# Patient Record
Sex: FEMALE | Race: WHITE
[De-identification: names, ages, dates, MRNs, and addresses within clinical notes are randomized per-mention and may not be internally consistent; named-entity substitution may affect disease eponyms.]

---

## 2017-03-13 ENCOUNTER — HOSPITAL ENCOUNTER (EMERGENCY)
Dept: HOSPITAL 45 - C.EDB | Age: 82
Discharge: HOME | End: 2017-03-13
Payer: COMMERCIAL

## 2017-03-13 VITALS
BODY MASS INDEX: 24.83 KG/M2 | HEIGHT: 67.99 IN | WEIGHT: 163.8 LBS | WEIGHT: 163.8 LBS | HEIGHT: 67.99 IN | BODY MASS INDEX: 24.83 KG/M2

## 2017-03-13 VITALS
DIASTOLIC BLOOD PRESSURE: 99 MMHG | TEMPERATURE: 97.88 F | OXYGEN SATURATION: 98 % | SYSTOLIC BLOOD PRESSURE: 222 MMHG | HEART RATE: 64 BPM

## 2017-03-13 DIAGNOSIS — E03.9: ICD-10-CM

## 2017-03-13 DIAGNOSIS — K21.9: ICD-10-CM

## 2017-03-13 DIAGNOSIS — Z85.3: ICD-10-CM

## 2017-03-13 DIAGNOSIS — Z90.10: ICD-10-CM

## 2017-03-13 DIAGNOSIS — E11.9: ICD-10-CM

## 2017-03-13 DIAGNOSIS — Z79.899: ICD-10-CM

## 2017-03-13 DIAGNOSIS — I10: Primary | ICD-10-CM

## 2017-03-13 LAB
ALP SERPL-CCNC: 72 U/L (ref 45–117)
ALT SERPL-CCNC: 18 U/L (ref 12–78)
ANION GAP SERPL CALC-SCNC: 10 MMOL/L (ref 3–11)
APPEARANCE UR: CLEAR
AST SERPL-CCNC: 16 U/L (ref 15–37)
BASOPHILS # BLD: 0.06 K/UL (ref 0–0.2)
BASOPHILS NFR BLD: 0.9 %
BILIRUB UR-MCNC: (no result) MG/DL
BUN SERPL-MCNC: 31 MG/DL (ref 7–18)
BUN/CREAT SERPL: 23.5 (ref 10–20)
CALCIUM SERPL-MCNC: 9.5 MG/DL (ref 8.5–10.1)
CHLORIDE SERPL-SCNC: 106 MMOL/L (ref 98–107)
CO2 SERPL-SCNC: 25 MMOL/L (ref 21–32)
COLOR UR: YELLOW
COMPLETE: YES
CREAT CL PREDICTED SERPL C-G-VRATE: 35.4 ML/MIN
CREAT SERPL-MCNC: 1.3 MG/DL (ref 0.6–1.2)
EOSINOPHIL NFR BLD AUTO: 256 K/UL (ref 130–400)
GLUCOSE SERPL-MCNC: 110 MG/DL (ref 70–99)
HCT VFR BLD CALC: 38.9 % (ref 37–47)
IG%: 0.2 %
IMM GRANULOCYTES NFR BLD AUTO: 28.4 %
INR PPP: 1.1 (ref 0.9–1.1)
LYMPHOCYTES # BLD: 1.84 K/UL (ref 1.2–3.4)
MANUAL MICROSCOPIC REQUIRED?: NO
MCH RBC QN AUTO: 30.8 PG (ref 25–34)
MCHC RBC AUTO-ENTMCNC: 34.7 G/DL (ref 32–36)
MCV RBC AUTO: 88.6 FL (ref 80–100)
MONOCYTES NFR BLD: 12.7 %
NEUTROPHILS # BLD AUTO: 2.5 %
NEUTROPHILS NFR BLD AUTO: 55.3 %
NITRITE UR QL STRIP: (no result)
PARTIAL THROMBOPLASTIN RATIO: 1
PH UR STRIP: 5.5 [PH] (ref 4.5–7.5)
PMV BLD AUTO: 9.4 FL (ref 7.4–10.4)
POTASSIUM SERPL-SCNC: 3.6 MMOL/L (ref 3.5–5.1)
PROTHROMBIN TIME: 11.6 SECONDS (ref 9–12)
RBC # BLD AUTO: 4.39 M/UL (ref 4.2–5.4)
REVIEW REQ?: NO
SODIUM SERPL-SCNC: 141 MMOL/L (ref 136–145)
SP GR UR STRIP: 1.02 (ref 1–1.03)
TSH SERPL-ACNC: 1.93 UIU/ML (ref 0.3–4.5)
URINE BILL WITH OR WITHOUT MIC: (no result)
URINE EPITHELIAL CELL AUTO: (no result) /LPF (ref 0–5)
UROBILINOGEN UR-MCNC: (no result) MG/DL
WBC # BLD AUTO: 6.47 K/UL (ref 4.8–10.8)

## 2017-03-13 NOTE — EMERGENCY ROOM VISIT NOTE
History


Report prepared by Wendy:  Katie Estrada


Under the Supervision of:  Dr. Benitez Ryan M.D.


First contact with patient:  13:24


Chief Complaint:  HYPERTENSION


Stated Complaint:  HIGH BLOOD PRESSURE





History of Present Illness


The patient is a 81 year old female who presents to the Emergency Room with 

complaints of persistent hypertension throughout the day today. The patient 

states that she woke up this morning around 4AM feeling "miserable" and with 

ear ringing. She took her blood pressure at that time and it was elevated. She 

has taken it a few times since then and continued to have high blood pressure, 

so she decided to come to the ER. She reports that she had a stressor at baby 

shower this past weekend and she is under the impression that this could have 

contributed to her blood pressure. She has a history of hypertension and has 

been on medications in the past including Maxzide, but was taken off of the 

medications by her PCP, although she cannot remember when. Pt denies LOC, 

headache, fevers, chills, diaphoresis, visual changes, neck pain, chest pain, 

breathing difficulties, nausea, vomiting, abdominal pain, back pain, melena, 

hematochezia, urinary symptoms, numbness, weakness, lymphadenopathy, rash, or 

other complaints.





   Source of History:  patient


   Onset:  today


   Position:  other (global)


   Quality:  other (hypertension)


   Timing:  other (persistent)





Review of Systems


See HPI for pertinent positives and negatives.  A total of ten systems were 

reviewed and were otherwise negative.





Past Medical & Surgical


Medical Problems:


(1) Breast cancer


(2) Diet-controlled type 2 diabetes mellitus


(3) GERD (gastroesophageal reflux disease)


(4) HTN (hypertension)


(5) Hypothyroidism


Surgical Problems:


(1) H/O mastectomy








Family History


No pertinent family history stated.





Social History


Smoking Status:  Never Smoker


Marital Status:  


Housing Status:  lives with significant other





Current/Historical Medications


Scheduled


Amlodipine Besylate (Norvasc), 1 TAB PO DAILY


Cholecalciferol (Vitamin D3), 2,000 INTER.UNIT PO DAILY


Escitalopram Oxalate (Lexapro), 10 MG PO DAILY


Levothyroxine Sodium (Synthroid), 75 MCG PO DAILY


Ranitidine Hcl (Zantac), 150 MG PO DAILY





Allergies


Coded Allergies:  


     Sulfa Drugs (Verified  Allergy, Unknown, TAKES DYAZIDE AT HOME W/O RXN, 3/

13/17)





Physical Exam


Vital Signs











  Date Time  Temp Pulse Resp B/P Pulse Ox O2 Delivery O2 Flow Rate FiO2


 


3/13/17 18:08 36.6 64 13 222/99 98   


 


3/13/17 18:01    222/99    


 


3/13/17 18:00  64 13  98   


 


3/13/17 17:46    208/107    


 


3/13/17 17:45  62 14  95   


 


3/13/17 17:31    204/108    


 


3/13/17 17:30  62 15  98   


 


3/13/17 17:16    196/99    


 


3/13/17 17:15  63 18  100   


 


3/13/17 17:01    217/103    


 


3/13/17 17:00  68 21  100   


 


3/13/17 16:57    208/102    


 


3/13/17 16:55  69 18 208/102 97 Room Air  


 


3/13/17 16:54  67   87   


 


3/13/17 16:39  60   97   


 


3/13/17 16:24  60   99   


 


3/13/17 16:09  61   93   


 


3/13/17 15:54  62 19  97   


 


3/13/17 15:39  61 16  97   


 


3/13/17 15:24  60 14  98   


 


3/13/17 15:09  62 16  100   


 


3/13/17 14:54  61 13  97   


 


3/13/17 14:39  62 20  100   


 


3/13/17 14:38  62      


 


3/13/17 14:18  63 14 175/87 98 Room Air  


 


3/13/17 14:17    175/87    


 


3/13/17 14:00      Room Air  


 


3/13/17 12:52 36.6 68 16 106/66 99 Room Air  











Physical Exam


GENERAL: Awake, alert, well appearing, no distress


HENT: Normocephalic, atraumatic.  TM's normal.  Oropharynx unremarkable.


EYES: PERRL. EOMI.  Normal conjunctiva. Sclera non-icteric.


NECK: Supple. No nuchal rigidity.  FROM.  No JVD or bruit.


RESPIRATORY:  CTA


CARDIAC: RRR.  No murmur. 


ABDOMEN: Soft, non distended.  No tenderness to palpation.  No rebound or 

guarding.  No masses.


RECTAL: Deferred.


MUSCULOSKELETAL:  Unremarkable.  No edema.  No discoloration.  Gross motor 

strength symmetric.  


NEURO: Cranial nerves 2-12 grossly intact.   Normal sensorium.  No sensory or 

motor deficits noted.  Speech normal.  No pronator drift.   


SKIN: No rash or jaundice noted.


LYMPH: No adenopathy.





Medical Decision & Procedures


ER Provider


Diagnostic Interpretation:


Radiology results as stated below per my review and radiologist interpretation: 








SINGLE VIEW CHEST





CLINICAL HISTORY:  Hypertension.





FINDINGS: An AP, portable, upright chest radiograph is compared to study dated


11/29/2014. The examination is degraded by portable technique and patient


rotation.  The cardiomediastinal silhouette is unremarkable. There is


atherosclerotic calcification of the thoracic aorta. Chronic interstitial


thickening is noted. The lungs and pleural spaces are clear. No pneumothorax is


seen. The skeletal structures are osteopenic. Advanced arthritic change is seen


in the shoulders, right greater than left.





IMPRESSION: No acute cardiopulmonary abnormality.











Electronically signed by:  Skip Wong M.D.


3/13/2017 2:10 PM





Dictated Date/Time:  3/13/2017 2:09 PM





Laboratory Results


3/13/17 14:05








Red Blood Count 4.39, Mean Corpuscular Volume 88.6, Mean Corpuscular Hemoglobin 

30.8, Mean Corpuscular Hemoglobin Concent 34.7, Mean Platelet Volume 9.4, 

Neutrophils (%) (Auto) 55.3, Lymphocytes (%) (Auto) 28.4, Monocytes (%) (Auto) 

12.7, Eosinophils (%) (Auto) 2.5, Basophils (%) (Auto) 0.9, Neutrophils # (Auto

) 3.58, Lymphocytes # (Auto) 1.84, Monocytes # (Auto) 0.82, Eosinophils # (Auto

) 0.16, Basophils # (Auto) 0.06





3/13/17 14:05

















Test


  3/13/17


14:05 3/13/17


17:00


 


White Blood Count


  6.47 K/uL


(4.8-10.8) 


 


 


Red Blood Count


  4.39 M/uL


(4.2-5.4) 


 


 


Hemoglobin


  13.5 g/dL


(12.0-16.0) 


 


 


Hematocrit 38.9 % (37-47)  


 


Mean Corpuscular Volume


  88.6 fL


() 


 


 


Mean Corpuscular Hemoglobin


  30.8 pg


(25-34) 


 


 


Mean Corpuscular Hemoglobin


Concent 34.7 g/dl


(32-36) 


 


 


Platelet Count


  256 K/uL


(130-400) 


 


 


Mean Platelet Volume


  9.4 fL


(7.4-10.4) 


 


 


Neutrophils (%) (Auto) 55.3 %  


 


Lymphocytes (%) (Auto) 28.4 %  


 


Monocytes (%) (Auto) 12.7 %  


 


Eosinophils (%) (Auto) 2.5 %  


 


Basophils (%) (Auto) 0.9 %  


 


Neutrophils # (Auto)


  3.58 K/uL


(1.4-6.5) 


 


 


Lymphocytes # (Auto)


  1.84 K/uL


(1.2-3.4) 


 


 


Monocytes # (Auto)


  0.82 K/uL


(0.11-0.59) 


 


 


Eosinophils # (Auto)


  0.16 K/uL


(0-0.5) 


 


 


Basophils # (Auto)


  0.06 K/uL


(0-0.2) 


 


 


RDW Standard Deviation


  42.8 fL


(36.4-46.3) 


 


 


RDW Coefficient of Variation


  13.2 %


(11.5-14.5) 


 


 


Immature Granulocyte % (Auto) 0.2 %  


 


Immature Granulocyte # (Auto)


  0.01 K/uL


(0.00-0.02) 


 


 


Prothrombin Time


  11.6 SECONDS


(9.0-12.0) 


 


 


Prothromb Time International


Ratio 1.1 (0.9-1.1) 


  


 


 


Activated Partial


Thromboplast Time 25.7 SECONDS


(21.0-31.0) 


 


 


Partial Thromboplastin Ratio 1.0  


 


Anion Gap


  10.0 mmol/L


(3-11) 


 


 


Est Creatinine Clear Calc


Drug Dose 35.4 ml/min 


  


 


 


Estimated GFR (


American) 45.2 


  


 


 


Estimated GFR (Non-


American 39.0 


  


 


 


BUN/Creatinine Ratio 23.5 (10-20)  


 


Calcium Level


  9.5 mg/dl


(8.5-10.1) 


 


 


Total Bilirubin


  0.5 mg/dl


(0.2-1) 


 


 


Direct Bilirubin


  < 0.1 mg/dl


(0-0.2) 


 


 


Aspartate Amino Transf


(AST/SGOT) 16 U/L (15-37) 


  


 


 


Alanine Aminotransferase


(ALT/SGPT) 18 U/L (12-78) 


  


 


 


Alkaline Phosphatase


  72 U/L


() 


 


 


Total Protein


  7.0 gm/dl


(6.4-8.2) 


 


 


Albumin


  3.8 gm/dl


(3.4-5.0) 


 


 


Lipase


  164 U/L


() 


 


 


Thyroid Stimulating Hormone


(TSH) 1.930 uIu/ml


(0.300-4.500) 


 


 


Urine Color  YELLOW 


 


Urine Appearance  CLEAR (CLEAR) 


 


Urine pH  5.5 (4.5-7.5) 


 


Urine Specific Gravity


  


  1.017


(1.000-1.030)


 


Urine Protein  NEG (NEG) 


 


Urine Glucose (UA)  NEG (NEG) 


 


Urine Ketones  TRACE (NEG) 


 


Urine Occult Blood  NEG (NEG) 


 


Urine Nitrite  NEG (NEG) 


 


Urine Bilirubin  NEG (NEG) 


 


Urine Urobilinogen  NEG (NEG) 


 


Urine Leukocyte Esterase  TRACE (NEG) 


 


Urine WBC (Auto)  1-5 /hpf (0-5) 


 


Urine RBC (Auto)  0-4 /hpf (0-4) 


 


Urine Hyaline Casts (Auto)  1-5 /lpf (0-5) 


 


Urine Epithelial Cells (Auto)


  


  20-30 /lpf


(0-5)


 


Urine Bacteria (Auto)  NEG (NEG) 





Laboratory results reviewed by me





Medications Administered











 Medications


  (Trade)  Dose


 Ordered  Sig/Jimmy


 Route  Start Time


 Stop Time Status Last Admin


Dose Admin


 


 Amlodipine


 Besylate


  (Norvasc Tab)  5 mg  NOW  ONCE


 PO  3/13/17 17:15


 3/13/17 17:16 DC 3/13/17 18:10


5 MG











ECG


Indication:  other (hypertension)


Rate (beats per minute):  67


Rhythm:  normal sinus


Findings:  LAFB, Q waves (Septal), no ectopy, other (LVH)





ED Course


1330: The patient was evaluated in room C2. A complete history and physical 

exam was performed.





1614: I reassessed the patient and updated her on results so far. 





1634: I discussed the case with Dr. Browning - [facility]. She 

recommended 5 of Norvasc. She will see the patient in the office. 





1715: Ordered Norvasc 5 mg PO. 





1717: I reevaluated the patient. She was resting comfortably. Discussed results 

and discharge instructions: She verbalized understanding and agreement. The 

patient is ready for discharge.





Medical Decision


Prior records/ancillary studies reviewed regarding the history above.





Triage Nursing notes reviewed and agree them.





Additional history obtained from the family.





The patient's history was concerning for hypertension.





Differential diagnosis:


Etiologies such as benign hypertension, hypertensive emergency, cardiovascular 

pathology, pheochromocytoma, electrolyte abnormality, renal disease, endorgan 

damage, as well as others were entertained. 





Physical examination:


As above.clinically the patient was doing very well.  Asymptomatic 


 


ER treatment provided:


Oral Norvasc


On reassessment the patient felt better.





Diagnostic interpretation by me:


The electrocardiogram was negative for pathologic change. 





The labs revealed an unremarkable CT scan, chemistry panel, urinalysis, LFTs 

and TSH.





Imaging studies:


Chest x-ray as above.





Consultation:


A consultation was placed with the patient's primary physician. The case was 

discussed and diagnostics were reviewed.  Norvasc 5 mg was recommended with 

close follow-up in the office for evaluation and continued management





The patient has significant hypertension but is asymptomatic.


By the evaluation outlined above emergent etiologies such as hypertensive 

emergency, pheochromocytoma, endorgan damage,  cardiac ischemia, aortic 

dissection, pulmonary embolism, pneumonia, pneumothorax, infections, 

gastrointestinal, as well as others were deemed relatively unlikely.  





The patient and  were informed about the findings as listed above.  All 

questions were answered and they were pleased with the treatment.  Return 

instructions were outlined and the patient was discharged in stable condition.  





Outpatient prescription management:


Norvasc 





Referral:


The patient was referred back to her primary care physician for follow-up in 2 

to 3 days for a recheck of the current condition.











The chart was completed utilizing Dragon Speech voice recognition software.   

Grammatical errors, random word insertions, pronoun errors, and incomplete 

sentences are an occasional consequence of this system due to software 

limitations, ambient noise, and hardware issues.  Any formal questions or 

concerns about the content, text, or information contained within the body of 

this dictation should be directly addressed to the physician for clarification.





Consults


Time Called:  1630


Consulting Physician:  Dr. Browning - [facility]


Returned Call:  1634


I discussed the case with her. She recommended 5 of Norvasc. She will see the 

patient in the office.





Impression





 Primary Impression:  


 HTN (hypertension)





Scribe Attestation


The scribe's documentation has been prepared under my direction and personally 

reviewed by me in its entirety. I confirm that the note above accurately 

reflects all work, treatment, procedures, and medical decision making performed 

by me.





Departure Information


Dispostion


Home / Self-Care





Prescriptions





Amlodipine Besylate (NORVASC) 5 Mg Tab


1 TAB PO DAILY for 30 Days, #30 TAB


   Prov: Benitez Ryan MD         3/13/17





Referrals


Yash Browning M.D. (PCP)





Patient Instructions


Hypertension [place], [facility]





Additional Instructions





Monitor blood pressure twice daily and record this for your family doctor.





Rest and drink plenty of fluids as tolerated.





Continue current medications.





Avoid strenuous activities and anything that worsens your pain.  Resume normal 

activities once your symptoms resolve.    





Return to the ER immediately for passing out, severe headache, chest pain, 

abdominal pain, vomiting, fevers, chest pains, difficulty breathing, worsening 

of your condition, or as needed.





Follow up with your primary physician tomorrow to schedule an appointment for a 

recheck of your current condition.

## 2017-03-13 NOTE — DIAGNOSTIC IMAGING REPORT
SINGLE VIEW CHEST



CLINICAL HISTORY:  Hypertension.



FINDINGS: An AP, portable, upright chest radiograph is compared to study dated

11/29/2014. The examination is degraded by portable technique and patient

rotation.  The cardiomediastinal silhouette is unremarkable. There is

atherosclerotic calcification of the thoracic aorta. Chronic interstitial

thickening is noted. The lungs and pleural spaces are clear. No pneumothorax is

seen. The skeletal structures are osteopenic. Advanced arthritic change is seen

in the shoulders, right greater than left.



IMPRESSION: No acute cardiopulmonary abnormality.







Electronically signed by:  Skip Wong M.D.

3/13/2017 2:10 PM



Dictated Date/Time:  3/13/2017 2:09 PM

## 2018-04-26 ENCOUNTER — HOSPITAL ENCOUNTER (EMERGENCY)
Dept: HOSPITAL 45 - C.EDA | Age: 83
Discharge: HOME | End: 2018-04-26
Payer: COMMERCIAL

## 2018-04-26 VITALS — DIASTOLIC BLOOD PRESSURE: 76 MMHG | HEART RATE: 65 BPM | SYSTOLIC BLOOD PRESSURE: 180 MMHG

## 2018-04-26 VITALS — TEMPERATURE: 97.7 F

## 2018-04-26 VITALS
WEIGHT: 152.12 LBS | BODY MASS INDEX: 23.05 KG/M2 | HEIGHT: 67.99 IN | BODY MASS INDEX: 23.05 KG/M2 | HEIGHT: 67.99 IN | WEIGHT: 152.12 LBS

## 2018-04-26 VITALS — OXYGEN SATURATION: 98 %

## 2018-04-26 DIAGNOSIS — Z23: ICD-10-CM

## 2018-04-26 DIAGNOSIS — Z90.10: ICD-10-CM
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
## 2018-04-26 DIAGNOSIS — Z88.2: ICD-10-CM

## 2018-04-26 DIAGNOSIS — Z85.3: ICD-10-CM

## 2018-04-26 DIAGNOSIS — E03.9: ICD-10-CM

## 2018-04-26 DIAGNOSIS — S09.90XA: Primary | ICD-10-CM

## 2018-04-26 DIAGNOSIS — I10: ICD-10-CM
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
## 2018-04-26 DIAGNOSIS — K21.9: ICD-10-CM
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
## 2018-04-26 DIAGNOSIS — E11.9: ICD-10-CM

## 2018-04-26 DIAGNOSIS — S01.81XA: ICD-10-CM

## 2018-04-26 DIAGNOSIS — Z79.899: ICD-10-CM
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
## 2018-04-26 NOTE — DIAGNOSTIC IMAGING REPORT
CT OF THE CERVICAL SPINE WITHOUT CONTRAST



CLINICAL HISTORY: Fall.    



COMPARISON STUDY:  No previous studies for comparison. 



TECHNIQUE:  Helical axial images of the cervical spine were obtained without IV

contrast.  Sagittal and coronal reconstructions were viewed.  A dose lowering

technique was utilized adhering to the principles of ALARA.





FINDINGS: Alignment of the cervical spine is anatomic. No fracture is present.

Craniocervical junction is intact. There is moderate multilevel degenerative

disc disease and facet arthrosis. There is no prevertebral edema.





IMPRESSION: No acute cervical spine fracture or subluxation.







Electronically signed by:  Lamberto Nunez M.D.

4/26/2018 3:47 PM



Dictated Date/Time:  4/26/2018 3:33 PM

## 2018-04-26 NOTE — DIAGNOSTIC IMAGING REPORT
CHEST ONE VIEW PORTABLE



CLINICAL HISTORY: trauma    



COMPARISON STUDY:  Chest radiograph March 13, 2017.



FINDINGS: There are right axillary surgical clips. No pneumothorax or pleural

effusion is noted. Patient is mildly rotated. No airspace opacities are present.

Cardiomediastinal silhouette is unremarkable. 



IMPRESSION:  No acute cardiopulmonary findings. 









Electronically signed by:  Lamberto Nunez M.D.

4/26/2018 3:49 PM



Dictated Date/Time:  4/26/2018 3:48 PM

## 2018-04-26 NOTE — EMERGENCY ROOM VISIT NOTE
History


Report prepared by Wendy:  Clark Weston


Under the Supervision of:  Dr. Eleonora Rios D.O.


First contact with patient:  14:50


Chief Complaint:  FALL


Stated Complaint:  FALL, LAC ABOVE LEFT EYE





History of Present Illness


The patient is an 82 year old female who presents to the Emergency Room via EMS 

with complaints of a sudden mechanical fall that occurred earlier this 

afternoon. She states that she is coming from home. The patient notes that she 

was walking out of her garage, and was walking down 2 concrete steps when she 

lost her balance and stumbled forward. She says that she tried to get her hands 

out to break the fall, and grazed her head on the surrounding ground, but did 

not hit her head on the concrete. The patient says that she was not dizzy or 

lightheaded before or after the fall. She notes that she has a resulting 

laceration around her left eye, and a laceration on her left hand. Per EMS, the 

patient was a bit hypertensive after the fall, but has a history of 

hypertension and is on Amlodipine for that. The patient adds that she is a bit 

nauseous, but that is from being nervous. She says that she feels fine otherwise

, and denies any loss of consciousness. The patient states that she remembers 

everything from the fall. She denies any chest pain, shortness of breath, 

abdominal pain, back pain, or vomiting. The patient adds that she is not on any 

blood thinners. She notes that she takes Tylenol every evening. She says that 

she has a history of losing her balance. The patient does not recall when her 

last tetanus shot was.





   Source of History:  patient, EMS


   Onset:  [place] this afternoon


   Position:  other (global)


   Symptom Intensity:  remembers whole event


   Quality:  other (fall - mechanical)


   Timing:  other (sudden)


   Associated Symptoms:  + nausea, No LOC, No chest pain, No SOB, No vomiting, 

No abdominal pain, No back pain


Note:


Associated symptoms: Laceration left eye, and left hand. Denies dizziness, 

lightheadedness.





Review of Systems


See HPI for pertinent positives & negatives. A total of 10 systems reviewed and 

were otherwise negative.





Past Medical & Surgical


Medical Problems:


(1) Breast cancer


(2) Diet-controlled type 2 diabetes mellitus


(3) GERD (gastroesophageal reflux disease)


(4) HTN (hypertension)


(5) Hypothyroidism


Surgical Problems:


(1) H/O mastectomy








Family History


Family history omitted secondary to patient's advanced age.





Social History


Smoking Status:  Never Smoker


Marital Status:  


Housing Status:  lives with significant other





Current/Historical Medications


Scheduled


Acetaminophen (Tylenol), 500 MG PO QPM


Amlodipine (Norvasc), 5 MG PO QPM


Cholecalciferol (Vitamin D3), 2,000 INTER.UNIT PO QPM


Escitalopram Oxalate (Lexapro), 10 MG PO QPM


Levothyroxine Sodium (Levothyroxine Sodium), 125 MCG PO QPM


Ranitidine Hcl (Zantac), 150 MG PO QPM


Solifenacin Succinate (Vesicare), 5 MG PO QPM





Allergies


Coded Allergies:  


     Sulfa Drugs (Verified  Allergy, Unknown, TAKES DYAZIDE AT HOME W/O RXN, 4/ 26/18)





Physical Exam


Vital Signs











  Date Time  Temp Pulse Resp B/P (MAP) Pulse Ox O2 Delivery O2 Flow Rate FiO2


 


4/26/18 18:25  65 20 180/76    


 


4/26/18 17:29  78 20 216/102    


 


4/26/18 15:58  82 20 204/106 98 Room Air  


 


4/26/18 15:08  76 20 193/94 98 Room Air  


 


4/26/18 14:59 36.5 77 16 202/121 98 Room Air  











Physical Exam


GENERAL: alert, well appearing, well nourished, no distress, non-toxic 


EYE EXAM: normal conjunctiva, PERRL and EOM's grossly intact


HEAD: Superficial abrasion at the left lateral supraorbital ridge. No other 

facial swelling or bony tenderness. Head is otherwise normocephalic atraumatic.


OROPHARYNX: no exudate, no erythema, lips, buccal mucosa, and tongue normal and 

mucous membranes are moist


NECK: supple, no nuchal rigidity, no adenopathy, non-tender


LUNGS: Clear to auscultation. Normal chest wall mechanics


HEART: no murmurs, S1 normal and S2 normal 


ABDOMEN: abdomen soft, non-tender, normo-active bowel sounds, no masses, no 

rebound or guarding. 


BACK: Back is symmetrical on inspection and there is no deformity, no midline 

tenderness, no CVA tenderness. 


SKIN: no rashes and no bruising 


UPPER EXTREMITIES: Superficial abrasion to the proximal palmar aspect of the 

left hand.


LOWER EXTREMITIES: No pitting edema.


NEURO EXAM: Normal sensorium, cranial nerves II-XII grossly intact, normal 

speech,  no gross weakness of arms, no gross weakness of legs.





Medical Decision & Procedures


ER Provider


Diagnostic Interpretation:


Radiology results have been interpreted by the radiologist and reviewed by me.








PELVIS 1 OR 2 VIEW ROUTINE





CLINICAL HISTORY: trauma    





COMPARISON STUDY:  No previous studies for comparison.





FINDINGS: Sacroiliac joints and symphysis pubis are intact. No acute fracture is


identified within the pelvis or hips. Pelvic calcifications likely reflect


phleboliths.





IMPRESSION:  No acute fracture within the pelvis or hips. 





Electronically signed by:  Lamberto Nunez M.D.


4/26/2018 3:51 PM





Dictated Date/Time:  4/26/2018 3:50 PM











CT OF THE HEAD WITHOUT CONTRAST





CLINICAL HISTORY: Fall.    





COMPARISON STUDY:  MRI of the brain January 4, 2008. 





CT DOSE: 893.36 mGy.cm





TECHNIQUE: Helical axial images of the head were obtained without IV contrast.


Automated exposure control was utilized for the study.  A dose lowering


technique was utilized adhering to the principles of ALARA.








FINDINGS: No acute intracranial hemorrhage, midline shift or mass effect is


present. Ventricular dilatation is likely due to atrophy. White matter


hypodensities suggest moderate small vessel disease. The basilar cisterns are


patent. There are no extra-axial collections. 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


cerebellar hemisphere is unchanged. There is no calvarial fracture. No is made


of a small left periorbital contusion. Left globe is intact. There is no


retrobulbar hematoma.





IMPRESSION:  





1. No acute intracranial findings.





2. Small left periorbital contusion. Left globe intact. No retrobulbar hematoma.


No calvarial fracture. 








Electronically signed by:  Lamberto Nunez M.D.


4/26/2018 3:32 PM





Dictated Date/Time:  4/26/2018 3:30 PM











CHEST ONE VIEW PORTABLE





CLINICAL HISTORY: trauma    





COMPARISON STUDY:  Chest radiograph March 13, 2017.





FINDINGS: There are right axillary surgical clips. No pneumothorax or pleural


effusion is noted. Patient is mildly rotated. No airspace opacities are present.


Cardiomediastinal silhouette is unremarkable. 





IMPRESSION:  No acute cardiopulmonary findings. 








Electronically signed by:  Lamberto Nunez M.D.


4/26/2018 3:49 PM





Dictated Date/Time:  4/26/2018 3:48 PM











CT OF THE CERVICAL SPINE WITHOUT CONTRAST





CLINICAL HISTORY: Fall.    





COMPARISON STUDY:  No previous studies for comparison. 





TECHNIQUE:  Helical axial images of the cervical spine were obtained without IV


contrast.  Sagittal and coronal reconstructions were viewed.  A dose lowering


technique was utilized adhering to the principles of ALARA.








FINDINGS: Alignment of the cervical spine is anatomic. No fracture is present.


Craniocervical junction is intact. There is moderate multilevel degenerative


disc disease and facet arthrosis. There is no prevertebral edema.








IMPRESSION: No acute cervical spine fracture or subluxation.








Electronically signed by:  Lamberto Nunez M.D.


4/26/2018 3:47 PM





Dictated Date/Time:  4/26/2018 3:33 PM





Medications Administered











 Medications


  (Trade)  Dose


 Ordered  Sig/Jimmy


 Route  Start Time


 Stop Time Status Last Admin


Dose Admin


 


 Diphtheria/


 Pertussis/Tetanus


 Vacc


  (Adacel Inj)  0.5 ml  ONCE ONCE


 IM.  4/26/18 15:00


 4/26/18 15:01 DC 4/26/18 16:00


0.5 ML


 


 Lorazepam


  (Ativan Tab)  0.5 mg  NOW  STAT


 SL  4/26/18 16:24


 4/26/18 16:25 DC 4/26/18 16:49


0.5 MG


 


 Amlodipine


 Besylate


  (Norvasc Tab)  5 mg  NOW  ONCE


 PO  4/26/18 17:30


 4/26/18 17:31 DC 4/26/18 17:51


5 MG











Procedure


Location: Left lateral supraorbital ridge.





Total length: 1.5 cm





Complexity: Simple





Verbal consent was obtained after the risks and benefits were explained, 

including but not limited to bleeding, scarring, infection, pain, and bone/joint

/nerve damage. At this time, the risks of the procedure are less than the risks 

of NOT performing the procedure. A time out was taken and the correct patient 

and site identified. The skin was prepped with betadine. The target area was 

anesthetized with 3 ml's of 1% lidocaine with epinephrine. Copious irrigation 

was performed using normal saline. The skin was re-prepped with betadine and a 

sterile field set. The wound was explored for foreign bodies and none found. 

Examination revealed no injury to deep structures such as tendons, bone, or 

significant blood vessels. Debridement was not performed. The wound edges were 

approximated using 3, 5-0 simple interrupted nylon sutures. Hemostasis and 

excellent approximation was achieved. Antibacterial ointment and a sterile 

dressing applied. Detailed wound care instructions and signs and symptoms of 

infection reviewed with the patient. No complications and the patient tolerated 

the procedure well.





ED Course


1451: The patient was evaluated in room A10. A complete history and physical 

exam was performed. 





1500: Adacel Inj 0.5 ml IM.





1609: I reevaluated and updated the patient. I also performed a laceration 

repair.





1624: Ativan Tab 0.5 mg SL.





1700: Upon reevaluation, the patient is feeling better. I discussed the 

findings and the treatment plan with the patient.  She verbalizes agreement and 

understanding. 





1730: Norvasc Tab 5 mg PO.





1800: I reevaluated and updated the patient. She is resting comfortably and 

asymptomatic. I discussed the findings and treatment plan with the patient. She 

will be discharged as soon as her blood pressure goes down.





Medical Decision


Differential diagnoses include major intracranial, cervical, spinal, thoracic, 

abdominal, pelvic and neurologic injury.  Fracture, contusion, sprain, strain, 

laceration, abrasions included as well. 





Patient well-appearing here, imaging negative, patient with no other 

complaints.  Concern for patient's persistent hypertension which feels more 

likely situational related to her anxiety regarding being here.  Patient does 

take blood pressure medication daily.  Patient with no other symptoms to 

suggest hypertensive urgency/emergency.  Discussed with patient close follow-up 

with the family doctor to recheck her blood pressure, symptoms to watch and 

return for, precautions regarding head injuries and concussions, instructions 

regarding wound care and suture removal, she verbalized understanding was 

agreeable with plan.  I do not suspect additional occult traumatic injury.  

Patient not anticoagulated and low risk.  Patient ambulatory here with a steady 

gait and no new or evolving symptoms.





Medication Reconcilliation


Current Medication List:  was personally reviewed by me





Blood Pressure Screening


Patient's blood pressure:  Elevated blood pressure


Blood pressure disposition:  Referred to PCP





Impression





 Primary Impression:  


 Fall


 Additional Impressions:  


 CHI (closed head injury)


 Facial laceration


 Abrasion





Scribe Attestation


The scribe's documentation has been prepared under my direction and personally 

reviewed by me in its entirety. I confirm that the note above accurately 

reflects all work, treatment, procedures, and medical decision making performed 

by me.





Departure Information


Dispostion


Home / Self-Care





Referrals


Yash Browning M.D. (PCP)





Patient Instructions


My [facility]





Additional Instructions





Please be careful with ambulation to avoid any recurrent falls or injury.  The 

3 stitches on the side of your face need to be removed in 5-7 days.  Please 

watch for any redness, increased pain, bleeding or drainage from the site of 

the injury.  If you notice any of this please return to the ER immediately.  If 

you have any other worsening symptoms or new concerns including worsening 

headache, vision changes, dizziness, nausea or vomiting, numbness or tingling, 

neck or back pain, please return the emergency room.  Please follow-up with 

your family doctor as a precaution next week.





Problem Qualifiers








 Primary Impression:  


 Fall


 Encounter type:  initial encounter  Qualified Codes:  W19.XXXA - Unspecified 

fall, initial encounter


 Additional Impressions:  


 CHI (closed head injury)


 Encounter type:  initial encounter  Qualified Codes:  S09.90XA - Unspecified 

injury of head, initial encounter


 Facial laceration


 Encounter type:  initial encounter  Qualified Codes:  S01.81XA - Laceration 

without foreign body of other part of head, initial encounter

## 2018-04-26 NOTE — DIAGNOSTIC IMAGING REPORT
CT OF THE HEAD WITHOUT CONTRAST



CLINICAL HISTORY: Fall.    



COMPARISON STUDY:  MRI of the brain January 4, 2008. 



CT DOSE: 893.36 mGy.cm



TECHNIQUE: Helical axial images of the head were obtained without IV contrast.

Automated exposure control was utilized for the study.  A dose lowering

technique was utilized adhering to the principles of ALARA.





FINDINGS: No acute intracranial hemorrhage, midline shift or mass effect is

present. Ventricular dilatation is likely due to atrophy. White matter

hypodensities suggest moderate small vessel disease. The basilar cisterns are

patent. There are no extra-axial collections. 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

cerebellar hemisphere is unchanged. There is no calvarial fracture. No is made

of a small left periorbital contusion. Left globe is intact. There is no

retrobulbar hematoma.



IMPRESSION:  



1. No acute intracranial findings.



2. Small left periorbital contusion. Left globe intact. No retrobulbar hematoma.

No calvarial fracture. 







Electronically signed by:  Lamberto Nunez M.D.

4/26/2018 3:32 PM



Dictated Date/Time:  4/26/2018 3:30 PM

## 2018-04-26 NOTE — DIAGNOSTIC IMAGING REPORT
PELVIS 1 OR 2 VIEW ROUTINE



CLINICAL HISTORY: trauma    



COMPARISON STUDY:  No previous studies for comparison.



FINDINGS: Sacroiliac joints and symphysis pubis are intact. No acute fracture is

identified within the pelvis or hips. Pelvic calcifications likely reflect

phleboliths.



IMPRESSION:  No acute fracture within the pelvis or hips. 









Electronically signed by:  Lamberto Nunez M.D.

4/26/2018 3:51 PM



Dictated Date/Time:  4/26/2018 3:50 PM